# Patient Record
Sex: MALE | Race: WHITE | NOT HISPANIC OR LATINO | Employment: FULL TIME | URBAN - METROPOLITAN AREA
[De-identification: names, ages, dates, MRNs, and addresses within clinical notes are randomized per-mention and may not be internally consistent; named-entity substitution may affect disease eponyms.]

---

## 2019-11-07 ENCOUNTER — OFFICE VISIT (OUTPATIENT)
Dept: GASTROENTEROLOGY | Facility: CLINIC | Age: 24
End: 2019-11-07
Payer: COMMERCIAL

## 2019-11-07 ENCOUNTER — TELEPHONE (OUTPATIENT)
Dept: GASTROENTEROLOGY | Facility: CLINIC | Age: 24
End: 2019-11-07

## 2019-11-07 VITALS
HEIGHT: 72 IN | DIASTOLIC BLOOD PRESSURE: 84 MMHG | BODY MASS INDEX: 23.16 KG/M2 | WEIGHT: 171 LBS | SYSTOLIC BLOOD PRESSURE: 122 MMHG | HEART RATE: 70 BPM

## 2019-11-07 DIAGNOSIS — K62.5 BRBPR (BRIGHT RED BLOOD PER RECTUM): ICD-10-CM

## 2019-11-07 DIAGNOSIS — R10.13 EPIGASTRIC PAIN: Primary | ICD-10-CM

## 2019-11-07 DIAGNOSIS — F41.9 ANXIETY: ICD-10-CM

## 2019-11-07 DIAGNOSIS — R11.2 NAUSEA AND VOMITING, INTRACTABILITY OF VOMITING NOT SPECIFIED, UNSPECIFIED VOMITING TYPE: ICD-10-CM

## 2019-11-07 PROBLEM — R13.19 ESOPHAGEAL DYSPHAGIA: Status: ACTIVE | Noted: 2019-11-07

## 2019-11-07 PROCEDURE — 99214 OFFICE O/P EST MOD 30 MIN: CPT | Performed by: INTERNAL MEDICINE

## 2019-11-07 NOTE — TELEPHONE ENCOUNTER
Rec transfers call from Dr Tyrese Tolbert office/1st appt the 26th  Caro Martinez nurse at Dr Tyrese Tolbert office states Pt in the office w/ 10 days of nausea, vomiting, bloody stools and severe abdominal pain  I acknowledged Rec advised no soon appts; I noted it sounded like he should go to the ER   Call picked up by nurse

## 2019-11-07 NOTE — TELEPHONE ENCOUNTER
We were able to get patient in for an office visit today at 2 pm with Dr Ginna Diamond [place] office to fax progress note  No imaging  Labs (CBC, CMP) drawn in office today

## 2019-11-07 NOTE — PROGRESS NOTES
[address] [email or web address] - Outpatient Follow-up Note  Sugey Escamilla 21 y o  male MRN: [id number]  Encounter: [id number]    ASSESSMENT AND PLAN:      1  Epigastric pain  41-year-old male with ongoing issues with abdominal pain  It appears a lot of his symptoms are stress and anxiety induced  However, he is quite tender on exam today so we will proceed with upper endoscopy and ultrasound to further evaluate  Of note, EGD last April 2018 was normal with no evidence of H pylori or celiac or eosinophilic esophagitis  - esomeprazole (NexIUM) 20 mg capsule; Take 1 capsule (20 mg total) by mouth 2 (two) times a day before meals  Dispense: 30 capsule; Refill: 0  - US abdomen complete; Future  - obtain blood work from Dr Luis Stokes office drawn this morning  - Schedule EGD @ [address] [place] [address]  2  Nausea and vomiting, intractability of vomiting not specified, unspecified vomiting type    3  BRBPR (bright red blood per rectum)  Sounds hemorrhoidal given occasional constipation and straining  Had 3 episodes of bleeding  Never any diarrhea  At 1 point should do a colonoscopy but patient feels to nauseous at this point to do any kind of bowel prep  We will readdress this at the next visit  4  Anxiety  I discussed with the patient some options to reduce his anxiety and stress  Followup Appointment:  6 weeks  ______________________________________________________________________    Chief Complaint   Patient presents with    Abd  pain, blood in the stools     Referred by Dr Ronni Woods     HPI:  41-year-old male last evaluated by us in May 2018, here today for ongoing epigastric pain  Last year, he had an upper endoscopy for abdominal pain and dysphagia  At that time, the dysphagia was improved after dilation  All the biopsies were negative for EOE, HP, celiac  Patient states that dicyclomine helped some but not enough      Patient feels that over the past couple months, the abdominal pain progressed  Now he feels that any time he eats anything he is having a lot of discomfort 4 hours  Worse in the epigastric and right upper quadrant regions  A lot of nausea never any GI bleeding  Never any diarrhea  He did have 2 episodes of bright red blood per rectum associated with straining and formed stools  Never any diarrhea  No rectal pain  Review of systems positive for fatigue  Weight is stable  All other systems negative  Historical Information   Past Medical History:   Diagnosis Date    Anxiety      Past Surgical History:   Procedure Laterality Date    EGD  04/06/2018    Normal, biopsies negative for celiac, H pylori, eosinophilic esophagitis    ESOPHAGEAL DILATION       Social History     Substance and Sexual Activity   Alcohol Use Yes    Frequency: 2-4 times a month    Drinks per session: 5 or 6     Social History     Substance and Sexual Activity   Drug Use Never     Social History     Tobacco Use   Smoking Status Never Smoker   Smokeless Tobacco Never Used     Family History   Problem Relation Age of Onset    Lung cancer Mother     Estrogen Receptor Positive Tumor Mother     Colon cancer Neg Hx     Colon polyps Neg Hx          Current Outpatient Medications:     esomeprazole (NexIUM) 20 mg capsule  No Known Allergies  Reviewed medications and allergies and updated as indicated    PHYSICAL EXAM:    Blood pressure 122/84, pulse 70, height 6' (1 829 m), weight 77 6 kg (171 lb)  Body mass index is 23 19 kg/m²  General Appearance: NAD, cooperative, alert  Eyes: Anicteric, PERRLA, EOMI  ENT:  Normocephalic, atraumatic, normal mucosa  Neck:  Supple, symmetrical, trachea midline  Resp:  Clear to auscultation bilaterally; no rales, rhonchi or wheezing; respirations unlabored   CV:  S1 S2, Regular rate and rhythm; no murmur, rub, or gallop    GI:  Soft, diffusely uncomfortable but tender to palpation in the epigastric region, non-distended; normal bowel sounds; no masses, no organomegaly   Rectal: Deferred  Musculoskeletal: No cyanosis, clubbing or edema  Normal ROM  Skin:  No jaundice, rashes, or lesions   Heme/Lymph: No palpable cervical lymphadenopathy  Psych: Normal affect, good eye contact  Neuro: No gross deficits, AAOx3    Lab Results:   Patient states that he had blood work done this morning at doctor Eddie's office  We will need to obtain those records  Radiology Results:   No results found

## 2019-11-07 NOTE — LETTER
November 7, 2019     Eldon Niño  [address] [facility] [address]  [address] [facility] [employer]    Patient: Batsheva Duke   YOB: 1995   Date of Visit: 11/7/2019       Dear Dr Bridges December: Thank you for referring Batsheva Duke to me for evaluation  Below are my notes for this consultation  If you have questions, please do not hesitate to call me  I look forward to following your patient along with you  Sincerely,        Carole Hector MD        CC: No Recipients  Carole Hector MD  11/7/2019  2:43 PM  Sign at close encounter  [address] [facility] - Outpatient Follow-up Note  Batsheva Duke 21 y o  male MRN: [id number]  Encounter: [id number]    ASSESSMENT AND PLAN:      1  Epigastric pain  50-year-old male with ongoing issues with abdominal pain  It appears a lot of his symptoms are stress and anxiety induced  However, he is quite tender on exam today so we will proceed with upper endoscopy and ultrasound to further evaluate  Of note, EGD last April 2018 was normal with no evidence of H pylori or celiac or eosinophilic esophagitis  - esomeprazole (NexIUM) 20 mg capsule; Take 1 capsule (20 mg total) by mouth 2 (two) times a day before meals  Dispense: 30 capsule; Refill: 0  - US abdomen complete; Future  - obtain blood work from Dr Raquel Roberts office drawn this morning  - Schedule EGD @ [address] [place] [address]  2  Nausea and vomiting, intractability of vomiting not specified, unspecified vomiting type    3  BRBPR (bright red blood per rectum)  Sounds hemorrhoidal given occasional constipation and straining  Had 3 episodes of bleeding  Never any diarrhea  At 1 point should do a colonoscopy but patient feels to nauseous at this point to do any kind of bowel prep  We will readdress this at the next visit  4  Anxiety  I discussed with the patient some options to reduce his anxiety and stress        Followup Appointment:  6 weeks  ______________________________________________________________________    Chief Complaint   Patient presents with    Abd  pain, blood in the stools     Referred by Dr Nelsy Mason     HPI:  30-year-old male last evaluated by us in May 2018, here today for ongoing epigastric pain  Last year, he had an upper endoscopy for abdominal pain and dysphagia  At that time, the dysphagia was improved after dilation  All the biopsies were negative for EOE, HP, celiac  Patient states that dicyclomine helped some but not enough  Patient feels that over the past couple months, the abdominal pain progressed  Now he feels that any time he eats anything he is having a lot of discomfort 4 hours  Worse in the epigastric and right upper quadrant regions  A lot of nausea never any GI bleeding  Never any diarrhea  He did have 2 episodes of bright red blood per rectum associated with straining and formed stools  Never any diarrhea  No rectal pain  Review of systems positive for fatigue  Weight is stable  All other systems negative      Historical Information   Past Medical History:   Diagnosis Date    Anxiety      Past Surgical History:   Procedure Laterality Date    EGD  04/06/2018    Normal, biopsies negative for celiac, H pylori, eosinophilic esophagitis    ESOPHAGEAL DILATION       Social History     Substance and Sexual Activity   Alcohol Use Yes    Frequency: 2-4 times a month    Drinks per session: 5 or 6     Social History     Substance and Sexual Activity   Drug Use Never     Social History     Tobacco Use   Smoking Status Never Smoker   Smokeless Tobacco Never Used     Family History   Problem Relation Age of Onset    Lung cancer Mother     Estrogen Receptor Positive Tumor Mother     Colon cancer Neg Hx     Colon polyps Neg Hx          Current Outpatient Medications:     esomeprazole (NexIUM) 20 mg capsule  No Known Allergies  Reviewed medications and allergies and updated as indicated    PHYSICAL EXAM:    Blood pressure 122/84, pulse 70, height 6' (1 829 m), weight 77 6 kg (171 lb)  Body mass index is 23 19 kg/m²  General Appearance: NAD, cooperative, alert  Eyes: Anicteric, PERRLA, EOMI  ENT:  Normocephalic, atraumatic, normal mucosa  Neck:  Supple, symmetrical, trachea midline  Resp:  Clear to auscultation bilaterally; no rales, rhonchi or wheezing; respirations unlabored   CV:  S1 S2, Regular rate and rhythm; no murmur, rub, or gallop  GI:  Soft,  diffusely uncomfortable but tender to palpation in the epigastric region, non-distended; normal bowel sounds; no masses, no organomegaly   Rectal: Deferred  Musculoskeletal: No cyanosis, clubbing or edema  Normal ROM  Skin:  No jaundice, rashes, or lesions   Heme/Lymph: No palpable cervical lymphadenopathy  Psych: Normal affect, good eye contact  Neuro: No gross deficits, AAOx3    Lab Results:   Patient states that he had blood work done this morning at doctor Eddie's office  We will need to obtain those records  Radiology Results:   No results found

## 2020-01-06 ENCOUNTER — TELEPHONE (OUTPATIENT)
Dept: GASTROENTEROLOGY | Facility: CLINIC | Age: 25
End: 2020-01-06

## 2020-01-29 NOTE — TELEPHONE ENCOUNTER
Dr Kenyetta Saldana has been contacted to reschedule cancelled egd ordered from last ov with no response-please advise   Thank you

## 2024-06-13 ENCOUNTER — OFFICE VISIT (OUTPATIENT)
Dept: GASTROENTEROLOGY | Facility: CLINIC | Age: 29
End: 2024-06-13
Payer: COMMERCIAL

## 2024-06-13 ENCOUNTER — TELEPHONE (OUTPATIENT)
Dept: GASTROENTEROLOGY | Facility: CLINIC | Age: 29
End: 2024-06-13

## 2024-06-13 VITALS
SYSTOLIC BLOOD PRESSURE: 102 MMHG | DIASTOLIC BLOOD PRESSURE: 62 MMHG | HEIGHT: 72 IN | WEIGHT: 204.4 LBS | BODY MASS INDEX: 27.68 KG/M2

## 2024-06-13 DIAGNOSIS — R19.7 DIARRHEA, UNSPECIFIED TYPE: ICD-10-CM

## 2024-06-13 DIAGNOSIS — R10.30 LOWER ABDOMINAL PAIN: ICD-10-CM

## 2024-06-13 DIAGNOSIS — F41.9 ANXIETY: ICD-10-CM

## 2024-06-13 DIAGNOSIS — K62.5 RECTAL BLEEDING: Primary | ICD-10-CM

## 2024-06-13 PROCEDURE — 99204 OFFICE O/P NEW MOD 45 MIN: CPT | Performed by: INTERNAL MEDICINE

## 2024-06-13 RX ORDER — BUPROPION HYDROCHLORIDE 150 MG/1
TABLET ORAL
COMMUNITY
Start: 2024-05-10

## 2024-06-13 RX ORDER — POLYETHYLENE GLYCOL 3350 17 G/17G
238 POWDER, FOR SOLUTION ORAL ONCE
Qty: 238 G | Refills: 0 | Status: SHIPPED | OUTPATIENT
Start: 2024-06-13 | End: 2024-06-20 | Stop reason: HOSPADM

## 2024-06-13 RX ORDER — LORATADINE 10 MG/1
10 TABLET ORAL DAILY
COMMUNITY

## 2024-06-13 RX ORDER — ESCITALOPRAM OXALATE 10 MG/1
TABLET ORAL
COMMUNITY
Start: 2024-05-02

## 2024-06-13 NOTE — TELEPHONE ENCOUNTER
Scheduled date of colonoscopy (as of today): 9/13/24  Physician performing colonoscopy: CARMELO  Location of colonoscopy: [facility]  Bowel prep reviewed with patient: Miralax  Instructions reviewed with patient by: MARK  Clearances: N

## 2024-06-13 NOTE — PROGRESS NOTES
[facility] - Outpatient Consultation  Alo Pozo 28 y.o. male MRN: [id number]  Encounter: [id number]    ASSESSMENT AND PLAN:      1. Rectal bleeding  28M here today at the request of Marcie Morgan, DO for several months of rectal bleeding and diarrhea. Sounds like hemorrhoids, normal CBC recently. No FHx IBD.     - Colonoscopy; Future  - polyethylene glycol (MiraLax) 17 GM/SCOOP powder; Take 238 g by mouth once for 1 dose Take 238 g my mouth. Use as directed  Dispense: 238 g; Refill: 0    2. Diarrhea, unspecified type  Intermittent diarrhea 1-2 times a week depending on what he eats, likely IBS-D, carb malabsorption. If colonoscopy negative, consider dairy free diet/limiting weekend ETOH intake.    - Colonoscopy; Future  - polyethylene glycol (MiraLax) 17 GM/SCOOP powder; Take 238 g by mouth once for 1 dose Take 238 g my mouth. Use as directed  Dispense: 238 g; Refill: 0    3. Lower abdominal pain    4. Anxiety  Recently decreased dose of lexapro    Followup Appointment: 3 months  ______________________________________________________________________    Chief Complaint   Patient presents with   • Abdominal Pain     Lower   • Diarrhea   • Rectal Bleeding     BRBPR- in bm, in toilet bowel, on tissue       HPI:   Alo Pozo is a 28 y.o. year old male who presents today at the request of his PCP for diarrhea and rectal bleeding.  Patient states that for the past several months he has noticed intermittent episodes of loose stools, 1-2 times, on the weekends.  Regardless of the bowels however, he has noticed some rectal bleeding, sometimes significant amounts of bright red blood per rectum.  Never any melena.  A lot of lower abdominal cramping associated with the symptoms.  Denies any recent changes in meds except for the Lexapro which was dropped in its dose last year.  No fevers or chills.  No recent antibiotic use.  No family history of inflammatory bowel disease.  No nausea or  vomiting or upper GI symptoms.    Historical Information   Past Medical History:   Diagnosis Date   • Anxiety    • GI (gastrointestinal bleed)      Past Surgical History:   Procedure Laterality Date   • EGD  04/06/2018    Normal, biopsies negative for celiac, H pylori, eosinophilic esophagitis   • ESOPHAGEAL DILATION       Social History     Substance and Sexual Activity   Alcohol Use Yes   • Alcohol/week: 15.0 standard drinks of alcohol   • Types: 10 Cans of beer, 5 Shots of liquor per week     Social History     Substance and Sexual Activity   Drug Use Yes   • Frequency: 247.0 times per week   • Types: Marijuana    Comment: medica card     Social History     Tobacco Use   Smoking Status Former   • Current packs/day: 0.50   • Average packs/day: 0.5 packs/day for 5.0 years (2.5 ttl pk-yrs)   • Types: Cigarettes   Smokeless Tobacco Never     Family History   Problem Relation Age of Onset   • Lung cancer Mother    • Estrogen Receptor Positive Tumor Mother    • Early death Mother    • Depression Father    • Diabetes Father    • Colon cancer Neg Hx    • Colon polyps Neg Hx    • Crohn's disease Neg Hx        Meds/Allergies     Current Outpatient Medications:   •  buPROPion (WELLBUTRIN XL) 150 mg 24 hr tablet  •  escitalopram (LEXAPRO) 10 mg tablet  •  loratadine (CLARITIN) 10 mg tablet  •  patient supplied medication  •  polyethylene glycol (MiraLax) 17 GM/SCOOP powder    No Known Allergies    PHYSICAL EXAM:    Blood pressure 102/62, height 6' (1.829 m), weight 92.7 kg (204 lb 6.4 oz). Body mass index is 27.72 kg/m².  General Appearance: NAD, cooperative, alert  Eyes: Anicteric, conjunctiva pink   ENT:  Normocephalic, atraumatic, normal mucosa.    Neck:  Supple, symmetrical, trachea midline,   Resp:  Clear to auscultation bilaterally; no rales, rhonchi or wheezing; respirations unlabored   CV:  S1 S2, Regular rate and rhythm; no murmur, rub, or gallop.  GI:  Soft, non-tender, non-distended; normal bowel sounds; no  "masses, no organomegaly   Rectal: Deferred  Musculoskeletal: No cyanosis, clubbing or edema. Normal ROM.  Skin:  No jaundice, rashes, or lesions   Heme/Lymph: No palpable cervical lymphadenopathy  Psych: Normal affect, good eye contact  Neuro: No gross deficits, AAOx3    Lab Results:   No results found for: \"WBC\", \"HGB\", \"HCT\", \"MCV\", \"PLT\"  No results found for: \"NA\", \"K\", \"CL\", \"CO2\", \"ANIONGAP\", \"BUN\", \"CREATININE\", \"GLUCOSE\", \"GLUF\", \"CALCIUM\", \"CORRECTEDCA\", \"AST\", \"ALT\", \"ALKPHOS\", \"PROT\", \"BILITOT\", \"EGFR\"      Radiology Results:   No results found.      REVIEW OF SYSTEMS:    CONSTITUTIONAL: Denies any fever, chills, rigors, and weight loss.  HEENT: No earache or tinnitus. Denies hearing loss or visual disturbances.  CARDIOVASCULAR: No chest pain or palpitations.   RESPIRATORY: Denies any cough, hemoptysis, shortness of breath or dyspnea on exertion.  GASTROINTESTINAL: As noted in the History of Present Illness.   GENITOURINARY: No problems with urination. Denies any hematuria or dysuria.  NEUROLOGIC: No dizziness or vertigo, denies headaches.   MUSCULOSKELETAL: Denies any muscle or joint pain.   SKIN: Denies skin rashes or itching.   ENDOCRINE: Denies excessive thirst. Denies intolerance to heat or cold.  PSYCHOSOCIAL: Denies depression or anxiety. Denies any recent memory loss.     Answers submitted by the patient for this visit:  Abdominal Pain Questionnaire (Submitted on 6/6/2024)  Chief Complaint: Abdominal pain  Chronicity: recurrent  Onset: more than 1 year ago  Onset quality: gradual  Frequency: constantly  Episode duration: 3 Days  Progression since onset: waxing and waning  Pain location: generalized abdominal region  Pain - numeric: 6/10  Pain quality: aching, cramping, a sensation of fullness  Radiates to: back  anorexia: No  arthralgias: Yes  belching: Yes  constipation: Yes  diarrhea: Yes  dysuria: No  fever: No  flatus: Yes  frequency: Yes  headaches: Yes  hematochezia: Yes  hematuria: " No  melena: Yes  myalgias: Yes  nausea: Yes  weight loss: No  vomiting: Yes  Aggravated by: certain positions, eating  Relieved by: nothing, activity

## 2024-06-14 NOTE — TELEPHONE ENCOUNTER
Scheduled date of colonoscopy (as of today):6/20/2024  Physician performing colonoscopy:  Location of colonoscopy:Dr. Fay  Bowel prep reviewed with patient:Mark/Dul  Instructions reviewed with patient by:TadMark/Dul prep instructions sent via email to [email or web address]  Clearances: n/a

## 2024-06-18 ENCOUNTER — TELEPHONE (OUTPATIENT)
Dept: GASTROENTEROLOGY | Facility: AMBULATORY SURGERY CENTER | Age: 29
End: 2024-06-18

## 2024-06-19 ENCOUNTER — ANESTHESIA EVENT (OUTPATIENT)
Dept: ANESTHESIOLOGY | Facility: AMBULATORY SURGERY CENTER | Age: 29
End: 2024-06-19

## 2024-06-19 ENCOUNTER — ANESTHESIA (OUTPATIENT)
Dept: ANESTHESIOLOGY | Facility: AMBULATORY SURGERY CENTER | Age: 29
End: 2024-06-19

## 2024-06-20 ENCOUNTER — ANESTHESIA EVENT (OUTPATIENT)
Dept: GASTROENTEROLOGY | Facility: AMBULATORY SURGERY CENTER | Age: 29
End: 2024-06-20

## 2024-06-20 ENCOUNTER — HOSPITAL ENCOUNTER (OUTPATIENT)
Dept: GASTROENTEROLOGY | Facility: AMBULATORY SURGERY CENTER | Age: 29
Discharge: HOME | End: 2024-06-20
Attending: INTERNAL MEDICINE
Payer: COMMERCIAL

## 2024-06-20 ENCOUNTER — ANESTHESIA (OUTPATIENT)
Dept: GASTROENTEROLOGY | Facility: AMBULATORY SURGERY CENTER | Age: 29
End: 2024-06-20

## 2024-06-20 VITALS
SYSTOLIC BLOOD PRESSURE: 121 MMHG | TEMPERATURE: 97.3 F | WEIGHT: 205 LBS | DIASTOLIC BLOOD PRESSURE: 81 MMHG | BODY MASS INDEX: 27.77 KG/M2 | HEIGHT: 72 IN | HEART RATE: 51 BPM | OXYGEN SATURATION: 99 % | RESPIRATION RATE: 18 BRPM

## 2024-06-20 DIAGNOSIS — K62.5 RECTAL BLEEDING: ICD-10-CM

## 2024-06-20 DIAGNOSIS — R19.7 DIARRHEA, UNSPECIFIED TYPE: ICD-10-CM

## 2024-06-20 PROCEDURE — 88341 IMHCHEM/IMCYTCHM EA ADD ANTB: CPT | Performed by: STUDENT IN AN ORGANIZED HEALTH CARE EDUCATION/TRAINING PROGRAM

## 2024-06-20 PROCEDURE — 45380 COLONOSCOPY AND BIOPSY: CPT | Performed by: INTERNAL MEDICINE

## 2024-06-20 PROCEDURE — 88342 IMHCHEM/IMCYTCHM 1ST ANTB: CPT | Performed by: STUDENT IN AN ORGANIZED HEALTH CARE EDUCATION/TRAINING PROGRAM

## 2024-06-20 PROCEDURE — 88305 TISSUE EXAM BY PATHOLOGIST: CPT | Performed by: STUDENT IN AN ORGANIZED HEALTH CARE EDUCATION/TRAINING PROGRAM

## 2024-06-20 RX ORDER — SODIUM CHLORIDE, SODIUM LACTATE, POTASSIUM CHLORIDE, CALCIUM CHLORIDE 600; 310; 30; 20 MG/100ML; MG/100ML; MG/100ML; MG/100ML
20 INJECTION, SOLUTION INTRAVENOUS CONTINUOUS
Status: DISCONTINUED | OUTPATIENT
Start: 2024-06-20 | End: 2024-06-24 | Stop reason: HOSPADM

## 2024-06-20 RX ORDER — SODIUM CHLORIDE, SODIUM LACTATE, POTASSIUM CHLORIDE, CALCIUM CHLORIDE 600; 310; 30; 20 MG/100ML; MG/100ML; MG/100ML; MG/100ML
50 INJECTION, SOLUTION INTRAVENOUS CONTINUOUS
Status: DISCONTINUED | OUTPATIENT
Start: 2024-06-20 | End: 2024-06-24 | Stop reason: HOSPADM

## 2024-06-20 RX ORDER — ONDANSETRON 2 MG/ML
4 INJECTION INTRAMUSCULAR; INTRAVENOUS ONCE
Status: DISCONTINUED | OUTPATIENT
Start: 2024-06-20 | End: 2024-06-24 | Stop reason: HOSPADM

## 2024-06-20 RX ORDER — PROPOFOL 10 MG/ML
INJECTION, EMULSION INTRAVENOUS AS NEEDED
Status: DISCONTINUED | OUTPATIENT
Start: 2024-06-20 | End: 2024-06-20

## 2024-06-20 RX ORDER — SODIUM CHLORIDE, SODIUM LACTATE, POTASSIUM CHLORIDE, CALCIUM CHLORIDE 600; 310; 30; 20 MG/100ML; MG/100ML; MG/100ML; MG/100ML
INJECTION, SOLUTION INTRAVENOUS CONTINUOUS PRN
Status: DISCONTINUED | OUTPATIENT
Start: 2024-06-20 | End: 2024-06-20

## 2024-06-20 RX ADMIN — PROPOFOL 100 MG: 10 INJECTION, EMULSION INTRAVENOUS at 12:04

## 2024-06-20 RX ADMIN — PROPOFOL 100 MG: 10 INJECTION, EMULSION INTRAVENOUS at 11:59

## 2024-06-20 RX ADMIN — PROPOFOL 100 MG: 10 INJECTION, EMULSION INTRAVENOUS at 12:00

## 2024-06-20 RX ADMIN — PROPOFOL 50 MG: 10 INJECTION, EMULSION INTRAVENOUS at 12:11

## 2024-06-20 RX ADMIN — SODIUM CHLORIDE, SODIUM LACTATE, POTASSIUM CHLORIDE, CALCIUM CHLORIDE 50 ML/HR: 600; 310; 30; 20 INJECTION, SOLUTION INTRAVENOUS at 11:14

## 2024-06-20 RX ADMIN — PROPOFOL 100 MG: 10 INJECTION, EMULSION INTRAVENOUS at 12:02

## 2024-06-20 RX ADMIN — PROPOFOL 50 MG: 10 INJECTION, EMULSION INTRAVENOUS at 12:07

## 2024-06-20 RX ADMIN — SODIUM CHLORIDE, SODIUM LACTATE, POTASSIUM CHLORIDE, CALCIUM CHLORIDE: 600; 310; 30; 20 INJECTION, SOLUTION INTRAVENOUS at 11:43

## 2024-06-20 NOTE — ANESTHESIA PREPROCEDURE EVALUATION
Procedure:  COLONOSCOPY    Relevant Problems   ANESTHESIA   (-) History of anesthesia complications      CARDIO   (-) Chest pain   (-) BARDALES (dyspnea on exertion)      GI/HEPATIC   (+) BRBPR (bright red blood per rectum)   (+) Esophageal dysphagia      NEURO/PSYCH   (+) Anxiety      PULMONARY   (-) Shortness of breath   (-) Sleep apnea   (-) URI (upper respiratory infection)        Physical Exam    Airway    Mallampati score: II  TM Distance: >3 FB  Neck ROM: full     Dental       Cardiovascular      Pulmonary      Other Findings        Anesthesia Plan  ASA Score- 2     Anesthesia Type- IV sedation with anesthesia with ASA Monitors.         Additional Monitors:     Airway Plan:            Plan Factors-Exercise tolerance (METS): >4 METS.    Chart reviewed. EKG reviewed.  Existing labs reviewed. Patient summary reviewed.                  Induction- intravenous.    Postoperative Plan-         Informed Consent- Anesthetic plan and risks discussed with patient.  I personally reviewed this patient with the CRNA. Discussed and agreed on the Anesthesia Plan with the CRNA..

## 2024-06-20 NOTE — H&P
History and Physical - [facility]    Alo Pozo 28 y.o. male MRN: [id number]      HPI: Alo Pozo is a 28 y.o. male who presents for rectal bleeding and diarrhea.    No Known Allergies      REVIEW OF SYSTEMS: Per the HPI, and otherwise unremarkable.    Historical Information     Past Medical History:   Diagnosis Date    Anxiety     GI (gastrointestinal bleed)      Past Surgical History:   Procedure Laterality Date    EGD  04/06/2018    Normal, biopsies negative for celiac, H pylori, eosinophilic esophagitis    ESOPHAGEAL DILATION       Social History   Social History     Substance and Sexual Activity   Alcohol Use Yes    Alcohol/week: 15.0 standard drinks of alcohol    Types: 10 Cans of beer, 5 Shots of liquor per week     Social History     Substance and Sexual Activity   Drug Use Yes    Frequency: 247.0 times per week    Types: Marijuana    Comment: medica card pt states last used yesterday.     Social History     Tobacco Use   Smoking Status Former    Current packs/day: 0.50    Average packs/day: 0.5 packs/day for 5.0 years (2.5 ttl pk-yrs)    Types: Cigarettes   Smokeless Tobacco Never     Family History   Problem Relation Age of Onset    Lung cancer Mother     Estrogen Receptor Positive Tumor Mother     Early death Mother     Depression Father     Diabetes Father     Colon cancer Neg Hx     Colon polyps Neg Hx     Crohn's disease Neg Hx        Meds/Allergies       Current Outpatient Medications:     buPROPion (WELLBUTRIN XL) 150 mg 24 hr tablet    escitalopram (LEXAPRO) 10 mg tablet    loratadine (CLARITIN) 10 mg tablet    patient supplied medication    polyethylene glycol (MiraLax) 17 GM/SCOOP powder    Current Facility-Administered Medications:     lactated ringers infusion, 50 mL/hr, Intravenous, Continuous        Objective     /99   Pulse 71   Temp (!) 97.3 °F (36.3 °C) (Temporal)   Resp 15   Ht 6' (1.829 m)   Wt 93 kg (205 lb)   SpO2 97%   BMI 27.80  kg/m²       PHYSICAL EXAM    Gen: NAD AAOx3  Head: Normocephalic, Atraumatic  CV: S1S2 RRR no m/r/g  CHEST: Clear b/l no c/r/w  ABD: soft, +BS NT/ND no masses  EXT: no edema      ASSESSMENT/PLAN:  This is a 28 y.o. year old male here for colonoscopy, and he is stable and optimized for his procedure.

## 2024-06-20 NOTE — ANESTHESIA POSTPROCEDURE EVALUATION
Post-Op Assessment Note    CV Status:  Stable  Pain Score: 0    Pain management: adequate       Mental Status:  Sleepy   Hydration Status:  Stable   PONV Controlled:  None   Airway Patency:  Patent  There is a medical reason for not screening for obstructive sleep apnea and/or for not using two or more mitigation strategies   Post Op Vitals Reviewed: Yes    No anethesia notable event occurred.    Staff: CRNA               BP   98/57   Temp      Pulse  61   Resp   16   SpO2   97

## 2024-06-26 PROCEDURE — 88341 IMHCHEM/IMCYTCHM EA ADD ANTB: CPT | Performed by: STUDENT IN AN ORGANIZED HEALTH CARE EDUCATION/TRAINING PROGRAM

## 2024-06-26 PROCEDURE — 88305 TISSUE EXAM BY PATHOLOGIST: CPT | Performed by: STUDENT IN AN ORGANIZED HEALTH CARE EDUCATION/TRAINING PROGRAM

## 2024-06-26 PROCEDURE — 88342 IMHCHEM/IMCYTCHM 1ST ANTB: CPT | Performed by: STUDENT IN AN ORGANIZED HEALTH CARE EDUCATION/TRAINING PROGRAM

## 2024-07-09 ENCOUNTER — OFFICE VISIT (OUTPATIENT)
Dept: GASTROENTEROLOGY | Facility: CLINIC | Age: 29
End: 2024-07-09
Payer: COMMERCIAL

## 2024-07-09 VITALS
BODY MASS INDEX: 27.22 KG/M2 | HEIGHT: 72 IN | WEIGHT: 201 LBS | DIASTOLIC BLOOD PRESSURE: 76 MMHG | SYSTOLIC BLOOD PRESSURE: 118 MMHG

## 2024-07-09 DIAGNOSIS — F41.9 ANXIETY: ICD-10-CM

## 2024-07-09 DIAGNOSIS — K58.0 IRRITABLE BOWEL SYNDROME WITH DIARRHEA: Primary | ICD-10-CM

## 2024-07-09 DIAGNOSIS — K64.8 OTHER HEMORRHOIDS: ICD-10-CM

## 2024-07-09 PROCEDURE — 99214 OFFICE O/P EST MOD 30 MIN: CPT | Performed by: INTERNAL MEDICINE

## 2024-07-09 RX ORDER — DICYCLOMINE HYDROCHLORIDE 10 MG/1
10 CAPSULE ORAL 4 TIMES DAILY PRN
Qty: 60 CAPSULE | Refills: 2 | Status: SHIPPED | OUTPATIENT
Start: 2024-07-09 | End: 2024-07-10 | Stop reason: SDUPTHER

## 2024-07-09 NOTE — PROGRESS NOTES
[facility] - Outpatient Follow-up Note  Alo Pozo 28 y.o. male MRN: [id number]  Encounter: [id number]    ASSESSMENT AND PLAN:      1. Irritable bowel syndrome with diarrhea  28M here today for f/u. EGD/COLON results reviewed - OK.     Watching diet, eating less, limiting ETOH w improvement in symptoms in regards to bleeding and diarrhea. Still w some cramping post prandially.     - dicyclomine (BENTYL) 10 mg capsule; Take 1 capsule (10 mg total) by mouth 4 (four) times a day as needed (abdominal cramping)  Dispense: 60 capsule; Refill: 2    2. Other hemorrhoids    - high fiber diet  - increased water intake    3. Anxiety      Followup Appointment: 3 months  ______________________________________________________________________    Chief Complaint   Patient presents with   • Follow up to colonoscopy     HPI: 28-year-old male here today for follow-up.  Doing much better in regards to his diarrhea after controlling his diet.  Limiting his alcohol intake on the weekends and only eating smaller meals during the week.  Bleeding has resolved.  Still with some cramping however 3-4 times a week especially postprandially and before bowel movement.  EGD and colonoscopy results reviewed.    Historical Information   Past Medical History:   Diagnosis Date   • Anxiety    • GI (gastrointestinal bleed)      Past Surgical History:   Procedure Laterality Date   • EGD  04/06/2018    Normal, biopsies negative for celiac, H pylori, eosinophilic esophagitis   • ESOPHAGEAL DILATION       Social History     Substance and Sexual Activity   Alcohol Use Yes   • Alcohol/week: 15.0 standard drinks of alcohol   • Types: 10 Cans of beer, 5 Shots of liquor per week     Social History     Substance and Sexual Activity   Drug Use Yes   • Frequency: 247.0 times per week   • Types: Marijuana    Comment: medica card pt states last used yesterday.     Social History     Tobacco Use   Smoking Status Former   •  "Current packs/day: 0.50   • Average packs/day: 0.5 packs/day for 5.0 years (2.5 ttl pk-yrs)   • Types: Cigarettes   Smokeless Tobacco Never     Family History   Problem Relation Age of Onset   • Lung cancer Mother    • Estrogen Receptor Positive Tumor Mother    • Early death Mother    • Depression Father    • Diabetes Father    • Colon cancer Neg Hx    • Colon polyps Neg Hx    • Crohn's disease Neg Hx          Current Outpatient Medications:   •  buPROPion (WELLBUTRIN XL) 150 mg 24 hr tablet  •  dicyclomine (BENTYL) 10 mg capsule  •  escitalopram (LEXAPRO) 10 mg tablet  •  loratadine (CLARITIN) 10 mg tablet  •  patient supplied medication  No Known Allergies  Reviewed medications and allergies and updated as indicated    PHYSICAL EXAM:    Blood pressure 118/76, height 6' (1.829 m), weight 91.2 kg (201 lb). Body mass index is 27.26 kg/m².  General Appearance: NAD, cooperative, alert  Eyes: Anicteric, conjunctiva pink  ENT:  Normocephalic, atraumatic, normal mucosa.    Neck:  Supple, symmetrical, trachea midline  Resp:  Clear to auscultation bilaterally; no rales, rhonchi or wheezing; respirations unlabored   CV:  S1 S2, Regular rate and rhythm; no murmur, rub, or gallop.  GI:  Soft, non-tender, non-distended; normal bowel sounds; no masses, no organomegaly   Rectal: Deferred  Musculoskeletal: No cyanosis, clubbing or edema. Normal ROM.  Skin:  No jaundice, rashes, or lesions   Heme/Lymph: No palpable cervical lymphadenopathy  Psych: Normal affect, good eye contact  Neuro: No gross deficits, AAOx3    Lab Results:   No results found for: \"WBC\", \"HGB\", \"HCT\", \"MCV\", \"PLT\"  No results found for: \"NA\", \"K\", \"CL\", \"CO2\", \"ANIONGAP\", \"BUN\", \"CREATININE\", \"GLUCOSE\", \"GLUF\", \"CALCIUM\", \"CORRECTEDCA\", \"AST\", \"ALT\", \"ALKPHOS\", \"PROT\", \"BILITOT\", \"EGFR\"    Radiology Results:   Colonoscopy    Result Date: 6/20/2024  Narrative: Table formatting from the original result was not included. [facility] [address] [place] [address] " [facility] [id number] [phone] DATE OF SERVICE: 6/20/24 PHYSICIAN(S): Attending: Mylene Fay MD INDICATION: Rectal bleeding, Diarrhea, unspecified type POST-OP DIAGNOSIS: See the impression below. HISTORY: Prior colonoscopy: No prior colonoscopy. BOWEL PREPARATION: Miralax/Dulcolax PREPROCEDURE: Informed consent was obtained for the procedure, including sedation. Risks including but not limited to bleeding, infection, perforation, adverse drug reaction and aspiration were explained in detail. Also explained about less than 100% sensitivity with the exam and other alternatives. The patient was placed in the left lateral decubitus position. Procedure: Colonoscopy DETAILS OF PROCEDURE: Patient was taken to the procedure room where a time out was performed to confirm correct patient and correct procedure. The patient underwent monitored anesthesia care, which was administered by an anesthesia professional. The patient's blood pressure, ECG, ETCO2, heart rate, level of consciousness, oxygen and respirations were monitored throughout the procedure. A digital rectal exam was performed. The scope was introduced through the anus and advanced to the terminal ileum. Retroflexion was performed in the rectum. The quality of bowel preparation was evaluated using the Catarina Bowel Preparation Scale with scores of: right colon = 2, transverse colon = 2, left colon = 2. The total BBPS score was 6. Bowel prep was adequate. The patient experienced no blood loss. The procedure was not difficult. The patient tolerated the procedure well. There were no apparent adverse events. ANESTHESIA INFORMATION: ASA: II Anesthesia Type: IV Sedation with Anesthesia MEDICATIONS: No administrations occurring from 1157 to 1216 on 06/20/24 FINDINGS: Mild, localized edematous, erythematous, granular and nodular mucosa in the terminal ileum; performed cold forceps biopsy to rule out IBD Internal small hemorrhoids The ileocecal  valve, cecum, ascending colon, hepatic flexure, transverse colon, splenic flexure, descending colon, sigmoid colon, rectosigmoid and rectum appeared normal. Performed random biopsy using biopsy forceps. Biopsies taken for microscopic colitis and rule out IBD. EVENTS: Procedure Events Event Event Time ENDO CECUM REACHED 6/20/2024 12:03 PM ENDO SCOPE OUT TIME 6/20/2024 12:16 PM SPECIMENS: ID Type Source Tests Collected by Time Destination 1 : terminal ileum bx r/o ibd Tissue Ileum, ileostomy stoma TISSUE EXAM Mylene Fay MD 6/20/2024 12:18 PM  2 : right colon bx r/o ibd Tissue Large Intestine, Right/Ascending Colon TISSUE EXAM Mylene Fay MD 6/20/2024 12:19 PM  3 : transverse colon bx r/o ibd Tissue Large Intestine, Transverse Colon TISSUE EXAM Mylene Fay MD 6/20/2024 12:20 PM  4 : left colon bx r/o ibd Tissue Large Intestine, Left/Descending Colon TISSUE EXAM Mylene Fay MD 6/20/2024 12:21 PM  EQUIPMENT: Colonoscope -[id number]     Impression: Mild edematous, erythematous, granular, nodular mucosa in the terminal ileum; performed cold forceps biopsy to rule out IBD Small hemorrhoids The ileocecal valve, cecum, ascending colon, hepatic flexure, transverse colon, splenic flexure, descending colon, sigmoid colon, rectosigmoid and rectum appeared normal. Performed random biopsy using biopsy forceps. RECOMMENDATION: Resume home meds. Resume previous diet. The biopsy results will be available in inmobly in 1-2 weeks. Pending biopsy results, we will determine next course of treatment. Follow up with GI as previously scheduled. Follow up with your primary care provider as previously scheduled.   Mylene Fay MD

## 2024-07-10 DIAGNOSIS — K58.0 IRRITABLE BOWEL SYNDROME WITH DIARRHEA: ICD-10-CM

## 2024-07-11 RX ORDER — DICYCLOMINE HYDROCHLORIDE 10 MG/1
10 CAPSULE ORAL 4 TIMES DAILY PRN
Qty: 400 CAPSULE | Refills: 0 | Status: SHIPPED | OUTPATIENT
Start: 2024-07-11